# Patient Record
Sex: FEMALE
[De-identification: names, ages, dates, MRNs, and addresses within clinical notes are randomized per-mention and may not be internally consistent; named-entity substitution may affect disease eponyms.]

---

## 2019-05-30 PROBLEM — Z00.00 ENCOUNTER FOR PREVENTIVE HEALTH EXAMINATION: Status: ACTIVE | Noted: 2019-05-30

## 2021-07-20 ENCOUNTER — APPOINTMENT (OUTPATIENT)
Dept: OBGYN | Facility: CLINIC | Age: 71
End: 2021-07-20
Payer: MEDICARE

## 2021-07-20 ENCOUNTER — NON-APPOINTMENT (OUTPATIENT)
Age: 71
End: 2021-07-20

## 2021-07-20 VITALS
HEIGHT: 58 IN | BODY MASS INDEX: 31.49 KG/M2 | WEIGHT: 150 LBS | HEART RATE: 79 BPM | DIASTOLIC BLOOD PRESSURE: 78 MMHG | SYSTOLIC BLOOD PRESSURE: 130 MMHG | TEMPERATURE: 98 F

## 2021-07-20 DIAGNOSIS — Z78.9 OTHER SPECIFIED HEALTH STATUS: ICD-10-CM

## 2021-07-20 DIAGNOSIS — Z78.0 ASYMPTOMATIC MENOPAUSAL STATE: ICD-10-CM

## 2021-07-20 DIAGNOSIS — Z63.4 DISAPPEARANCE AND DEATH OF FAMILY MEMBER: ICD-10-CM

## 2021-07-20 DIAGNOSIS — Z86.39 PERSONAL HISTORY OF OTHER ENDOCRINE, NUTRITIONAL AND METABOLIC DISEASE: ICD-10-CM

## 2021-07-20 DIAGNOSIS — Z86.79 PERSONAL HISTORY OF OTHER DISEASES OF THE CIRCULATORY SYSTEM: ICD-10-CM

## 2021-07-20 DIAGNOSIS — Z82.49 FAMILY HISTORY OF ISCHEMIC HEART DISEASE AND OTHER DISEASES OF THE CIRCULATORY SYSTEM: ICD-10-CM

## 2021-07-20 DIAGNOSIS — Z01.419 ENCOUNTER FOR GYNECOLOGICAL EXAMINATION (GENERAL) (ROUTINE) W/OUT ABNORMAL FINDINGS: ICD-10-CM

## 2021-07-20 PROCEDURE — G0101: CPT

## 2021-07-20 RX ORDER — ENALAPRIL MALEATE 10 MG/1
10 TABLET ORAL
Refills: 0 | Status: ACTIVE | COMMUNITY

## 2021-07-20 RX ORDER — METFORMIN HYDROCHLORIDE 500 MG/1
500 TABLET, COATED ORAL
Refills: 0 | Status: ACTIVE | COMMUNITY

## 2021-07-20 RX ORDER — ROSUVASTATIN CALCIUM 10 MG/1
10 TABLET, FILM COATED ORAL
Refills: 0 | Status: ACTIVE | COMMUNITY

## 2021-07-20 SDOH — SOCIAL STABILITY - SOCIAL INSECURITY: DISSAPEARANCE AND DEATH OF FAMILY MEMBER: Z63.4

## 2021-07-20 NOTE — PHYSICAL EXAM
[Appropriately responsive] : appropriately responsive [Alert] : alert [No Acute Distress] : no acute distress [No Lymphadenopathy] : no lymphadenopathy [Soft] : soft [Non-tender] : non-tender [Non-distended] : non-distended [No HSM] : No HSM [No Lesions] : no lesions [No Mass] : no mass [Oriented x3] : oriented x3 [Examination Of The Breasts] : a normal appearance [No Discharge] : no discharge [No Masses] : no breast masses were palpable [Labia Majora] : normal [Labia Minora] : normal [Uterine Adnexae] : normal [Normal] : normal [FreeTextEntry6] : wnl [Vulvar Atrophy] : vulvar atrophy [FreeTextEntry1] : right labia majora inner sight mild white discoloration at 7 pm not raised

## 2021-07-20 NOTE — DISCUSSION/SUMMARY
[FreeTextEntry1] : \par 69 yo p2 for annual exam , h/o ovarian cyst\par mammogram\par colonoscopy up tp date\par dexa\par rto fro pelvic sono for ovarian cyst monitoring\par

## 2021-07-20 NOTE — HISTORY OF PRESENT ILLNESS
[Patient reported mammogram was normal] : Patient reported mammogram was normal [Patient reported colonoscopy was normal] : Patient reported colonoscopy was normal [Menarche Age: ____] : age at menarche was [unfilled] [Menopause Age: ____] : age at menopause was [unfilled] [No] : Patient does not have concerns regarding sex [Y] : Positive pregnancy history [TextBox_4] : no fibroids  or std\par h/o ovarian cyst , seen by dr Samaniego - wnl \par  [Mammogramdate] : 2020 [ColonoscopyDate] : 2017 [PGHxTotal] : 4 [[facility] : 2 [PGxPremature] : 1 [PGHxAbortions] : 2 [[facility]xLiving] : 2 [FreeTextEntry1] : [id number]

## 2021-09-16 ENCOUNTER — APPOINTMENT (OUTPATIENT)
Dept: OBGYN | Facility: CLINIC | Age: 71
End: 2021-09-16

## 2021-10-13 DIAGNOSIS — Z87.42 PERSONAL HISTORY OF OTHER DISEASES OF THE FEMALE GENITAL TRACT: ICD-10-CM

## 2021-10-14 ENCOUNTER — APPOINTMENT (OUTPATIENT)
Dept: OBGYN | Facility: CLINIC | Age: 71
End: 2021-10-14
Payer: MEDICARE

## 2021-10-14 ENCOUNTER — ASOB RESULT (OUTPATIENT)
Age: 71
End: 2021-10-14

## 2021-10-14 VITALS
SYSTOLIC BLOOD PRESSURE: 130 MMHG | HEIGHT: 58 IN | BODY MASS INDEX: 31.49 KG/M2 | HEART RATE: 79 BPM | TEMPERATURE: 97.8 F | WEIGHT: 150 LBS | DIASTOLIC BLOOD PRESSURE: 78 MMHG

## 2021-10-14 PROCEDURE — ZZZZZ: CPT

## 2021-10-14 PROCEDURE — 76857 US EXAM PELVIC LIMITED: CPT | Mod: 59

## 2021-10-14 PROCEDURE — 99214 OFFICE O/P EST MOD 30 MIN: CPT

## 2021-10-14 PROCEDURE — 76830 TRANSVAGINAL US NON-OB: CPT

## 2021-10-14 NOTE — DISCUSSION/SUMMARY
[FreeTextEntry1] : 70 yo p2 with persistent simple left ovarian cyst, stable \par pelvic sono - unchanged dimensions of left ovarian cyst\par cea and ca125 today\par \par

## 2021-10-14 NOTE — PROCEDURE
[Suspected Ovarian Cyst] : suspected ovarian cyst [FreeTextEntry4] : left ovarian cyst [id number] stable simple, nl uterus and endo, limited view of right ovary due to bowel

## 2021-10-14 NOTE — HISTORY OF PRESENT ILLNESS
[FreeTextEntry1] : 69 yo p2 for f/up  persistent left ovarian cyst with  pelvic sonogram.\par Patient denies any complaints , no vaginal bleeding, pelvic pain or bloating\par Was seen by gyn onc  around 5 yrs ago for evaluation, stable cyst - no intervention was advised\par \par 7/20 cyst 38x47 mm simple  cea 2,1 ca 125 5.9

## 2021-10-15 LAB
CANCER AG125 SERPL-ACNC: 5 U/ML
CEA SERPL-MCNC: 2.1 NG/ML